# Patient Record
Sex: MALE | Race: WHITE | NOT HISPANIC OR LATINO | Employment: UNEMPLOYED | URBAN - METROPOLITAN AREA
[De-identification: names, ages, dates, MRNs, and addresses within clinical notes are randomized per-mention and may not be internally consistent; named-entity substitution may affect disease eponyms.]

---

## 2018-08-02 ENCOUNTER — TRANSCRIBE ORDERS (OUTPATIENT)
Dept: NEUROSURGERY | Facility: CLINIC | Age: 63
End: 2018-08-02

## 2018-08-02 DIAGNOSIS — G89.4 CHRONIC PAIN SYNDROME: Primary | ICD-10-CM

## 2018-08-27 ENCOUNTER — OFFICE VISIT (OUTPATIENT)
Dept: NEUROSURGERY | Facility: CLINIC | Age: 63
End: 2018-08-27
Payer: COMMERCIAL

## 2018-08-27 VITALS
TEMPERATURE: 98.7 F | RESPIRATION RATE: 16 BRPM | HEART RATE: 57 BPM | SYSTOLIC BLOOD PRESSURE: 128 MMHG | WEIGHT: 208.4 LBS | HEIGHT: 67 IN | DIASTOLIC BLOOD PRESSURE: 68 MMHG | BODY MASS INDEX: 32.71 KG/M2

## 2018-08-27 DIAGNOSIS — M96.1 POST LAMINECTOMY SYNDROME: Primary | ICD-10-CM

## 2018-08-27 DIAGNOSIS — G90.512 COMPLEX REGIONAL PAIN SYNDROME TYPE 1 OF LEFT UPPER EXTREMITY: ICD-10-CM

## 2018-08-27 DIAGNOSIS — M54.12 RADICULOPATHY, CERVICAL: ICD-10-CM

## 2018-08-27 DIAGNOSIS — G89.4 CHRONIC PAIN SYNDROME: ICD-10-CM

## 2018-08-27 PROCEDURE — 99203 OFFICE O/P NEW LOW 30 MIN: CPT | Performed by: NEUROLOGICAL SURGERY

## 2018-08-27 RX ORDER — LEVOTHYROXINE SODIUM 0.03 MG/1
TABLET ORAL
COMMUNITY
Start: 2018-07-09

## 2018-08-27 RX ORDER — OMEGA-3 FATTY ACIDS/FISH OIL 300-1000MG
CAPSULE ORAL
COMMUNITY

## 2018-08-27 RX ORDER — ACETAMINOPHEN 500 MG
500 TABLET ORAL
COMMUNITY

## 2018-08-27 RX ORDER — PRAVASTATIN SODIUM 80 MG/1
80 TABLET ORAL
COMMUNITY
Start: 2016-11-14

## 2018-08-27 NOTE — PROGRESS NOTES
Assessment/Plan:    No problem-specific Assessment & Plan notes found for this encounter  Diagnoses and all orders for this visit:    Post laminectomy syndrome    Chronic pain syndrome  -     Ambulatory referral to Neurosurgery    Radiculopathy, cervical    Complex regional pain syndrome type 1 of left upper extremity    Other orders  -     acetaminophen (TYLENOL) 500 mg tablet; Take 500 mg by mouth  -     Ibuprofen 200 MG CAPS; Take by mouth  -     levothyroxine 25 mcg tablet; Take by mouth  -     pravastatin (PRAVACHOL) 80 mg tablet; Take 80 mg by mouth        Summary: This is a 71-year-old male with chronic pain involving his neck, bilateral shoulders, and left upper extremity  History of cervical fusion  His history and examination is consistent with a CRPS type picture of his left upper extremity  He is referred by Dr Sahara Sanz for surgery versus stimulator evaluation  His MRI of the cervical spine demonstrates postoperative changes from an anterior posterior C5 through 7 fusion  The posterior elements remain intact  Which is also confirmed on CT cervical spine  He does have mild adjacent level disease at C4-5 with mild central stenosis  I do not appreciate a surgical pathology that will alleviate his symptoms  Both the patient and wife report that they were told the same at [facility] by Dr Castro Million  He is a candidate for cervical spinal cord stimulator  Given intact posterior elements without scar tissue he is candidate for a routine cervical spinal cord stimulator trial   I am referring him to Dr Travis Glez to perform this  They will return to see me for permanent implant is successful  His pain management will continue to be by Dr Sahara Sanz  Subjective:      Patient ID: Amisha Fish is a 59 y o  male  HPI    This is a very pleasant 71-year-old male with a 1 5 year progressively worsening left upper extremity pain    He describes painful numbness and tingling in his left hand and finger tips  He also notes left lateral arm pain throughout  He also has neck and bilateral shoulder pain which is sharp  He has a history of an anterior cervical fusion in 2007, as well as a posterior cervical fusion in 2017  He subsequently developed left arm pain after that surgery and was found to have ulnar nerve compression and underwent ulnar nerve decompression in December 2017  He reports constant pain since that time  He does report intermittent swelling of his hand  He is followed by Dr Tanya Ritchie of Pain Management at [facility] who has referred him for cervical surgery and stimulator evaluation  He underwent a posterior cervical fusion without a decompression in [place] in 2017 by Dr Victoriano bearden at [facility] secondary to lack of fusion anterior  The patient reports he was having neck pain and severe headaches at that time which improved with the surgery  The following portions of the patient's history were reviewed and updated as appropriate: allergies, current medications, past family history, past medical history, past social history and past surgical history  Review of Systems   Constitutional: Positive for fatigue  HENT: Negative  Eyes: Negative  Respiratory: Negative  Cardiovascular: Negative  Gastrointestinal: Negative  Endocrine: Negative  Genitourinary: Negative  Musculoskeletal: Positive for neck pain (bilateral collar bone pain, bilateral shoulder blade pain upt o neck) and neck stiffness  Cervical fusion 6/2017  Ulnar nerve 12/2017   Skin: Negative  Allergic/Immunologic: Negative  Neurological: Positive for numbness (left pinky and ring finger numbness ) and headaches (HA from neck pain)  Hematological: Negative  Psychiatric/Behavioral: Negative            Objective:      /68 (BP Location: Left arm, Patient Position: Sitting, Cuff Size: Standard)   Pulse 57   Temp 98 7 °F (37 1 °C) (Tympanic)   Resp 16   Ht 5' 7" (1 702 m)   Wt 94 5 kg (208 lb 6 4 oz)   BMI 32 64 kg/m²          Physical Exam   Constitutional: He is oriented to person, place, and time  He appears well-developed  HENT:   Head: Normocephalic  Eyes: Pupils are equal, round, and reactive to light  Neck: Normal range of motion  Pulmonary/Chest: Effort normal    Musculoskeletal: Normal range of motion  Neurological: He is alert and oriented to person, place, and time  He has normal strength  No sensory deficit  Mild guarding of left upper extremity, but cooperative with exam           Results:   Reviewed imaging and report of MRI cervical spine May 2018  There are postoperative changes from anterior and posterior fusion C5 through 7  Posterior elements are intact  There is mild adjacent level disease with mild central stenosis at C4-5  Reviewed imaging and report of CT myelogram cervical spine June 2018  Confirmatory to findings of previous MRI  Posterior elements are intact

## 2018-08-27 NOTE — LETTER
August 27, 2018     Miguel Aparicio MD  [facility] [id number]    Patient: Amisha Fish   YOB: 1955   Date of Visit: 8/27/2018       Dear Dr Sahara Sanz:    Thank you for referring Amisha Fish to me for evaluation  Below are my notes for this consultation  If you have questions, please do not hesitate to call me  I look forward to following your patient along with you           Sincerely,        Karma Petty MD        CC: No Recipients

## 2018-08-28 ENCOUNTER — HOSPITAL ENCOUNTER (OUTPATIENT)
Dept: RADIOLOGY | Facility: HOSPITAL | Age: 63
Discharge: HOME | End: 2018-08-28
Attending: RADIOLOGY

## 2018-08-28 ENCOUNTER — TELEPHONE (OUTPATIENT)
Dept: PAIN MEDICINE | Facility: CLINIC | Age: 63
End: 2018-08-28

## 2018-08-28 DIAGNOSIS — Z76.89 REFERRAL OF PATIENT WITHOUT EXAMINATION OR TREATMENT: ICD-10-CM

## 2018-08-28 NOTE — TELEPHONE ENCOUNTER
Received an email from Debbie from [address] [place] [address] Avenue asking for some help in regards to getting patient scheduled for a consult with Dr Jeny Bautista for a scs trial after seeing Dr Lucrecia Dumont  Per Debbie pt was told he needs to obtain records  I see that his previous records are in his EPIC chart  Can you please have Dr Jeny Bautista review records so we can get pt in for consult? Thank you

## 2018-08-30 NOTE — TELEPHONE ENCOUNTER
Caller: Gilberto Alejandra, wife  Call back number: [phone]    Patient's wife is asking to speak to you about testing that she believes will be ordered tomorrow  She is requesting a call back   Please advise

## 2018-08-30 NOTE — TELEPHONE ENCOUNTER
S/W Laura  She was asking if the pt needs to get blood work or EKG done prior to his consult on 9/18/18  Advised her no, that he needs to be seen in the office and evaluated to determine a plan  She verbalized understanding

## 2018-09-18 ENCOUNTER — CONSULT (OUTPATIENT)
Dept: PAIN MEDICINE | Facility: MEDICAL CENTER | Age: 63
End: 2018-09-18
Payer: COMMERCIAL

## 2018-09-18 VITALS
WEIGHT: 202.2 LBS | BODY MASS INDEX: 31.74 KG/M2 | SYSTOLIC BLOOD PRESSURE: 112 MMHG | DIASTOLIC BLOOD PRESSURE: 70 MMHG | HEART RATE: 84 BPM | RESPIRATION RATE: 16 BRPM | HEIGHT: 67 IN

## 2018-09-18 DIAGNOSIS — M96.1 POST LAMINECTOMY SYNDROME: ICD-10-CM

## 2018-09-18 DIAGNOSIS — M54.12 RADICULOPATHY, CERVICAL: ICD-10-CM

## 2018-09-18 DIAGNOSIS — G89.4 CHRONIC PAIN SYNDROME: ICD-10-CM

## 2018-09-18 DIAGNOSIS — M47.812 SPONDYLOSIS OF CERVICAL REGION WITHOUT MYELOPATHY OR RADICULOPATHY: ICD-10-CM

## 2018-09-18 DIAGNOSIS — M54.2 NECK PAIN: Primary | ICD-10-CM

## 2018-09-18 PROCEDURE — 99244 OFF/OP CNSLTJ NEW/EST MOD 40: CPT | Performed by: PHYSICAL MEDICINE & REHABILITATION

## 2018-09-18 RX ORDER — MECLIZINE HYDROCHLORIDE 25 MG/1
TABLET ORAL
COMMUNITY
Start: 2018-09-18 | End: 2018-10-18

## 2018-09-18 RX ORDER — HYDROCODONE BITARTRATE AND ACETAMINOPHEN 5; 325 MG/1; MG/1
TABLET ORAL
COMMUNITY
Start: 2018-08-31

## 2018-09-18 RX ORDER — FLUOROURACIL 50 MG/G
CREAM TOPICAL
COMMUNITY
Start: 2018-09-06

## 2018-09-18 RX ORDER — CYCLOBENZAPRINE HCL 10 MG
TABLET ORAL
COMMUNITY
Start: 2018-09-10

## 2018-09-18 NOTE — LETTER
September 18, 2018     Shahla Osborne MD  [address] [place] [address]    Patient: Higinio Faust   YOB: 1955   Date of Visit: 9/18/2018       Dear Dr Charles Ibanez:    Thank you for referring Higinio Faust to me for evaluation  Below are my notes for this consultation  If you have questions, please do not hesitate to call me  I look forward to following your patient along with you  Sincerely,        Landon Porter DO        CC: MD Dereje Bolton MD Timm Alexandria, DO  9/18/2018  9:45 AM  Sign at close encounter  Assessment:  1  Neck pain    2  Chronic pain syndrome    3  Spondylosis of cervical region without myelopathy or radiculopathy    4  Post laminectomy syndrome    5  Radiculopathy, cervical        Plan:  1  We will schedule the patient for right C2-4 medial branch nerve blocks with intention of moving forward towards radiofrequency ablation if there is an appropriate diagnostic response  The initial blocks will be performed with 2% lidocaine and if an appropriate response is obtained upon review of the patient's pain diary, a confirmatory block will be scheduled with 0 5% bupivacaine  In the office today, we reviewed the nature of facet joint pathology in depth using a spine model  We discussed the approach we would use for the injections and provided literature for home review  The patient understands the risks associated with the procedure including bleeding, infection, tissue injury, and allergic reaction and provided verbal informed consent in the office today  2   We will also initiate the process for him to pursue a cervical spinal cord stimulator trial   The patient does have significant neck and associated arm pain 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  He does not have any laminectomy to prevent safe passage of a cervical lead    If we are able to obtain adequate relief with cervical radiofrequency ablation we may postpone or cancel his trial     My impressions and treatment recommendations were discussed in detail with the patient who verbalized understanding and had no further questions  Discharge instructions were provided  I personally saw and examined the patient and I agree with the above discussed plan of care  Orders Placed This Encounter   Procedures    FL spine and pain procedure     Standing Status:   Future     Standing Expiration Date:   9/18/2019     Order Specific Question:   Reason for Exam:     Answer:   (R) C2-4 MBB#1     Order Specific Question:   Anticoagulant hold needed? Answer:   no     New Medications Ordered This Visit   Medications    cyclobenzaprine (FLEXERIL) 10 mg tablet    fluorouracil (EFUDEX) 5 % cream     Sig: apply to forehead and scalp    HYDROcodone-acetaminophen (NORCO) 5-325 mg per tablet    meclizine (ANTIVERT) 25 mg tablet     Sig: Take by mouth       History of Present Illness:    Mynor Vo is a 59 y o  male Sent from Dr Alexx Pavon for consideration of cervical spinal cord stimulator trial   The patient has had 2 separate spine surgeries including posterior fusion from C5-7 without laminectomy  He has undergone a number of interventional approaches including epidural injections and facet joint injections with varying levels of relief  He currently describes moderate to severe intensity pain rated as an 8/10 which is constant without any typical pattern characterized as shooting, numbness, sharp, dull, aching, pins and needles  Aggravating factors include bending, exercise, coughing, sneezing  He is unable to determine any significant alleviating factors  Moderate relief with injections, physical therapy, and local heat or ice application  No relief with a TENS unit  Currently using cyclobenzaprine and Vicodin for improvement in pain    This has provided some minimal improvement but he is hopeful for further benefit and the option of no longer using medications  I have personally reviewed and/or updated the patient's past medical history, past surgical history, family history, social history, current medications, allergies, and vital signs today  Review of Systems:    Review of Systems   Constitutional: Negative for fever and unexpected weight change  HENT: Negative for trouble swallowing  Eyes: Negative for visual disturbance  Respiratory: Negative for shortness of breath and wheezing  Cardiovascular: Negative for chest pain and palpitations  Gastrointestinal: Negative for constipation, diarrhea, nausea and vomiting  Endocrine: Negative for cold intolerance, heat intolerance and polydipsia  Genitourinary: Negative for difficulty urinating and frequency  Musculoskeletal: Positive for gait problem, myalgias and neck pain  Negative for arthralgias and joint swelling  Skin: Negative for rash  Neurological: Positive for weakness  Negative for dizziness, seizures, syncope and headaches  Hematological: Does not bruise/bleed easily  Psychiatric/Behavioral: Positive for dysphoric mood  All other systems reviewed and are negative  There is no problem list on file for this patient  Past Medical History:   Diagnosis Date    Cervical disc disorder     Chronic pain disorder     Depression     Hypercholesteremia     Hypertension        Past Surgical History:   Procedure Laterality Date    CARPAL TUNNEL RELEASE  2010    SPINAL FUSION         No family history on file      Social History     Occupational History    unemployed      Social History Main Topics    Smoking status: Former Smoker    Smokeless tobacco: Never Used    Alcohol use No    Drug use: No    Sexual activity: Yes     Partners: Female       Current Outpatient Prescriptions on File Prior to Visit   Medication Sig    acetaminophen (TYLENOL) 500 mg tablet Take 500 mg by mouth    Ibuprofen 200 MG CAPS Take by mouth    levothyroxine 25 mcg tablet Take by mouth    pravastatin (PRAVACHOL) 80 mg tablet Take 80 mg by mouth     No current facility-administered medications on file prior to visit  Allergies   Allergen Reactions    Atorvastatin     Simvastatin     Sulfa Antibiotics      used in eye drop at one time    Sulfasalazine      used in eye drop at one time       Physical Exam:    /70   Pulse 84   Resp 16   Ht 5' 7" (1 702 m)   Wt 91 7 kg (202 lb 3 2 oz)   BMI 31 67 kg/m²        CERVICAL  General: Well-developed, well-nourished individual in no acute distress  Mental: Appropriate mood and affect  Grossly oriented with coherent speech and thought processing   Neuro:  Cranial nerves: Cranial nerve function is grossly intact bilaterally   Strength: Bilateral upper extremity strength is normal and symmetric   No atrophy or tone abnormalities noted   Reflexes: Bilateral upper extremity muscle stretch reflexes are physiologic and symmetric   No Strauss sign   Sensation: No loss of sensation is noted except over the left 5th digit which has decreased sensation to light touch and pinprick  Gait:  Gait/gross motor: Gait is normal  Station is normal      Musculoskeletal:  Palpation: Inspection and palpation of the spine and extremities are unremarkable   Spine:  Range of motion is limited in all planes by about 25% with pain at end ranges    No gross axial skeletal deformities   Skin: Skin inspection grossly negative for erythema, breakdown, or concerning lesions in affected area except for well-healed midline cervical incision posteriorly consistent with his surgical history  Lymph: No lymphadenopathy is appreciated in the involved extremity   Vessels: No lower extremity edema   Lungs: Breathing is comfortable and regular  No dyspnea noted during examination   Eyes: Visual field grossly intact to confrontation  No redness appreciated  ENT: No craniofacial deformities or asymmetry   No neck masses appreciated            Imaging

## 2018-09-18 NOTE — PROGRESS NOTES
Assessment:  1  Neck pain    2  Chronic pain syndrome    3  Spondylosis of cervical region without myelopathy or radiculopathy    4  Post laminectomy syndrome    5  Radiculopathy, cervical        Plan:  1  We will schedule the patient for right C2-4 medial branch nerve blocks with intention of moving forward towards radiofrequency ablation if there is an appropriate diagnostic response  The initial blocks will be performed with 2% lidocaine and if an appropriate response is obtained upon review of the patient's pain diary, a confirmatory block will be scheduled with 0 5% bupivacaine  In the office today, we reviewed the nature of facet joint pathology in depth using a spine model  We discussed the approach we would use for the injections and provided literature for home review  The patient understands the risks associated with the procedure including bleeding, infection, tissue injury, and allergic reaction and provided verbal informed consent in the office today  2   We will also initiate the process for him to pursue a cervical spinal cord stimulator trial   The patient does have significant neck and associated arm pain 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  He does not have any laminectomy to prevent safe passage of a cervical lead  If we are able to obtain adequate relief with cervical radiofrequency ablation we may postpone or cancel his trial     My impressions and treatment recommendations were discussed in detail with the patient who verbalized understanding and had no further questions  Discharge instructions were provided  I personally saw and examined the patient and I agree with the above discussed plan of care      Orders Placed This Encounter   Procedures    FL spine and pain procedure     Standing Status:   Future     Standing Expiration Date:   9/18/2019     Order Specific Question:   Reason for Exam:     Answer:   (R) C2-4 MBB#1     Order Specific Question:   Anticoagulant hold needed? Answer:   no     New Medications Ordered This Visit   Medications    cyclobenzaprine (FLEXERIL) 10 mg tablet    fluorouracil (EFUDEX) 5 % cream     Sig: apply to forehead and scalp    HYDROcodone-acetaminophen (NORCO) 5-325 mg per tablet    meclizine (ANTIVERT) 25 mg tablet     Sig: Take by mouth       History of Present Illness:    Dee Dee Almodovar is a 59 y o  male Sent from Dr Tara Mcintosh for consideration of cervical spinal cord stimulator trial   The patient has had 2 separate spine surgeries including posterior fusion from C5-7 without laminectomy  He has undergone a number of interventional approaches including epidural injections and facet joint injections with varying levels of relief  He currently describes moderate to severe intensity pain rated as an 8/10 which is constant without any typical pattern characterized as shooting, numbness, sharp, dull, aching, pins and needles  Aggravating factors include bending, exercise, coughing, sneezing  He is unable to determine any significant alleviating factors  Moderate relief with injections, physical therapy, and local heat or ice application  No relief with a TENS unit  Currently using cyclobenzaprine and Vicodin for improvement in pain  This has provided some minimal improvement but he is hopeful for further benefit and the option of no longer using medications  I have personally reviewed and/or updated the patient's past medical history, past surgical history, family history, social history, current medications, allergies, and vital signs today  Review of Systems:    Review of Systems   Constitutional: Negative for fever and unexpected weight change  HENT: Negative for trouble swallowing  Eyes: Negative for visual disturbance  Respiratory: Negative for shortness of breath and wheezing  Cardiovascular: Negative for chest pain and palpitations     Gastrointestinal: Negative for constipation, diarrhea, nausea and vomiting  Endocrine: Negative for cold intolerance, heat intolerance and polydipsia  Genitourinary: Negative for difficulty urinating and frequency  Musculoskeletal: Positive for gait problem, myalgias and neck pain  Negative for arthralgias and joint swelling  Skin: Negative for rash  Neurological: Positive for weakness  Negative for dizziness, seizures, syncope and headaches  Hematological: Does not bruise/bleed easily  Psychiatric/Behavioral: Positive for dysphoric mood  All other systems reviewed and are negative  There is no problem list on file for this patient  Past Medical History:   Diagnosis Date    Cervical disc disorder     Chronic pain disorder     Depression     Hypercholesteremia     Hypertension        Past Surgical History:   Procedure Laterality Date    CARPAL TUNNEL RELEASE  2010    SPINAL FUSION         No family history on file  Social History     Occupational History    unemployed      Social History Main Topics    Smoking status: Former Smoker    Smokeless tobacco: Never Used    Alcohol use No    Drug use: No    Sexual activity: Yes     Partners: Female       Current Outpatient Prescriptions on File Prior to Visit   Medication Sig    acetaminophen (TYLENOL) 500 mg tablet Take 500 mg by mouth    Ibuprofen 200 MG CAPS Take by mouth    levothyroxine 25 mcg tablet Take by mouth    pravastatin (PRAVACHOL) 80 mg tablet Take 80 mg by mouth     No current facility-administered medications on file prior to visit          Allergies   Allergen Reactions    Atorvastatin     Simvastatin     Sulfa Antibiotics      used in eye drop at one time    Sulfasalazine      used in eye drop at one time       Physical Exam:    /70   Pulse 84   Resp 16   Ht 5' 7" (1 702 m)   Wt 91 7 kg (202 lb 3 2 oz)   BMI 31 67 kg/m²       CERVICAL  General: Well-developed, well-nourished individual in no acute distress  Mental: Appropriate mood and affect  Grossly oriented with coherent speech and thought processing   Neuro:  Cranial nerves: Cranial nerve function is grossly intact bilaterally   Strength: Bilateral upper extremity strength is normal and symmetric   No atrophy or tone abnormalities noted   Reflexes: Bilateral upper extremity muscle stretch reflexes are physiologic and symmetric   No Strauss sign   Sensation: No loss of sensation is noted except over the left 5th digit which has decreased sensation to light touch and pinprick  Gait:  Gait/gross motor: Gait is normal  Station is normal      Musculoskeletal:  Palpation: Inspection and palpation of the spine and extremities are unremarkable   Spine:  Range of motion is limited in all planes by about 25% with pain at end ranges    No gross axial skeletal deformities   Skin: Skin inspection grossly negative for erythema, breakdown, or concerning lesions in affected area except for well-healed midline cervical incision posteriorly consistent with his surgical history  Lymph: No lymphadenopathy is appreciated in the involved extremity   Vessels: No lower extremity edema   Lungs: Breathing is comfortable and regular  No dyspnea noted during examination   Eyes: Visual field grossly intact to confrontation  No redness appreciated  ENT: No craniofacial deformities or asymmetry  No neck masses appreciated            Imaging

## 2018-09-24 ENCOUNTER — TELEPHONE (OUTPATIENT)
Dept: PAIN MEDICINE | Facility: CLINIC | Age: 63
End: 2018-09-24

## 2018-09-24 NOTE — TELEPHONE ENCOUNTER
Pt to be an [employer] cervical SCS trial with Dr Myers Standard  Pt signed release of info  Pt received script for psych eval with list of providers  Email sent to Debbie to contact pt for education  Clinical info excluding psych eval faxed to [employer]

## 2018-09-26 ENCOUNTER — TELEPHONE (OUTPATIENT)
Dept: PAIN MEDICINE | Facility: MEDICAL CENTER | Age: 63
End: 2018-09-26

## 2018-09-26 NOTE — TELEPHONE ENCOUNTER
Received request for patient information for Psych eval  Requested demographics and insurance information      Faxed interface sheet to [phone]

## 2018-10-03 ENCOUNTER — TELEPHONE (OUTPATIENT)
Dept: RADIOLOGY | Facility: MEDICAL CENTER | Age: 63
End: 2018-10-03

## 2018-10-03 ENCOUNTER — HOSPITAL ENCOUNTER (OUTPATIENT)
Dept: RADIOLOGY | Facility: MEDICAL CENTER | Age: 63
Discharge: HOME | End: 2018-10-03

## 2018-10-03 NOTE — TELEPHONE ENCOUNTER
Rec'd transferred message from patient  Has questions about his procedure today and his pain level prior       [phone]    Right C2-4 MBB #1

## 2018-10-03 NOTE — TELEPHONE ENCOUNTER
He can reschedule if he would prefer once this calms down so he can better determine degree of relief

## 2018-10-03 NOTE — TELEPHONE ENCOUNTER
S/W pt  Pt stated his neck pain is 5/10  He stated Monday am he took no pain pills and at lunch he could hardly move and he took his pain pills at 9 pm then  Pt thinks 90% of his pain is from his C5-C7 fusion and down  Pt stated most of his pain is across his shoulders and down his arms, pain is 8-10/10  He stated he can't  his coffee cup in the am and has to use 2 hands  Pt stated he is not sure if he can tell if the injection helps or not b/c all his other pain hurts a lot  Pt stated he is not sure if he can differentiate all of his pain if he gets the injection and is not sure if he should get the injection  Please advise

## 2018-10-11 NOTE — TELEPHONE ENCOUNTER
Phone call from pt's wife asking to schedule trial  I spoke with patient and advised I need to have psych eval before I can begin working on the insurance auth for the trial  Pt states he has his psych eval scheduled for 10/23/18 at [facility]  Pt is aware that once I receive psych eval I can begin insurance auth  Pt verbalized understanding

## 2023-02-15 NOTE — TELEPHONE ENCOUNTER
Late entry:     Patient called this morning @2110  He would prefer to postpone until he is able to better determine where his pain is at  He will call office to reschedule  (0) Answers both questions correctly